# Patient Record
(demographics unavailable — no encounter records)

---

## 2024-10-10 NOTE — ASSESSMENT
[FreeTextEntry1] : #Acute on chronic lower back pain w/ left-sided sciatica  In the setting of significant prior weight gain that she's now steadily losing while on Zepbound Also has been lifting her kids who are nearly ~40 lbs heavy  Prenuvo screening whole body MRI from Jul 2024 showed B/L L5 pars defects without spondylolisthesis with mild degenerative changes of cervical and lumbar spine  - Pain control as ordered - PT referral - [facility]  - Advised to preemptively know which closest UC/ED she will have in [place] in case she has a flare that does not resolve with this management

## 2024-10-10 NOTE — ASSESSMENT
[FreeTextEntry1] : #Acute on chronic lower back pain w/ left-sided sciatica  In the setting of significant prior weight gain that she's now steadily losing while on Zepbound Also has been lifting her kids who are nearly ~40 lbs heavy  Prenuvo screening whole body MRI from Jul 2024 showed B/L L5 pars defects without spondylolisthesis with mild degenerative changes of cervical and lumbar spine  - Pain control as ordered - PT referral - [facility]  - Advised to preemptively know which closest UC/ED she will have in [place] in case she has a flare that does not resolve with this management

## 2024-10-10 NOTE — PHYSICAL EXAM
[No Acute Distress] : no acute distress [Well Nourished] : well nourished [Well Developed] : well developed [Well-Appearing] : well-appearing [Normal Voice/Communication] : normal voice/communication [No Respiratory Distress] : no respiratory distress  [Normal Rate] : normal rate  [No Joint Swelling] : no joint swelling [Grossly Normal Strength/Tone] : grossly normal strength/tone [No Rash] : no rash [Coordination Grossly Intact] : coordination grossly intact [Normal Gait] : normal gait [Normal] : affect was normal and insight and judgment were intact [de-identified] : No midline lumbar tenderness. No paraspinal tenderness. Mild soreness to palpation of left lateral hip. SLR negative B/L LE.

## 2024-10-10 NOTE — HISTORY OF PRESENT ILLNESS
[FreeTextEntry1] : Acute on chronic lower back pain w/ left-sided sciatica  [de-identified] : Ave has a history of lower back pain that developed during her last 2 pregnancies and has been intermittent She has a history of significant weight gain in the setting of 3 pregnancies under ~4 years and is on Zepbound  She carries her kids at home and says her kids are heavy ~42 lbs 2 months ago she had a flare when she had to pause walking    She had a flare again today with shooting pain in her left leg; no numbness/tingling, no urinary/stool incontinence. She is here to get examined as she's planning to travel to [place] for a few days  At the time of my encounter, she was in very mild pain left lateral hip with no occurrence of shooting pain, was able to walk w/o support Has a Prenuvo screening whole body MRI from Jul 2024 that showed B/L L5 pars defects without spondylolisthesis with mild degenerative changes of cervical and lumbar spine

## 2024-10-10 NOTE — HISTORY OF PRESENT ILLNESS
[FreeTextEntry1] : Acute on chronic lower back pain w/ left-sided sciatica  [de-identified] : Ave has a history of lower back pain that developed during her last 2 pregnancies and has been intermittent She has a history of significant weight gain in the setting of 3 pregnancies under ~4 years and is on Zepbound  She carries her kids at home and says her kids are heavy ~42 lbs 2 months ago she had a flare when she had to pause walking    She had a flare again today with shooting pain in her left leg; no numbness/tingling, no urinary/stool incontinence. She is here to get examined as she's planning to travel to [place] for a few days  At the time of my encounter, she was in very mild pain left lateral hip with no occurrence of shooting pain, was able to walk w/o support Has a Prenuvo screening whole body MRI from Jul 2024 that showed B/L L5 pars defects without spondylolisthesis with mild degenerative changes of cervical and lumbar spine

## 2024-10-10 NOTE — PHYSICAL EXAM
[No Acute Distress] : no acute distress [Well Nourished] : well nourished [Well Developed] : well developed [Well-Appearing] : well-appearing [Normal Voice/Communication] : normal voice/communication [No Respiratory Distress] : no respiratory distress  [Normal Rate] : normal rate  [No Joint Swelling] : no joint swelling [Grossly Normal Strength/Tone] : grossly normal strength/tone [No Rash] : no rash [Coordination Grossly Intact] : coordination grossly intact [Normal Gait] : normal gait [Normal] : affect was normal and insight and judgment were intact [de-identified] : No midline lumbar tenderness. No paraspinal tenderness. Mild soreness to palpation of left lateral hip. SLR negative B/L LE.

## 2025-01-15 NOTE — HISTORY OF PRESENT ILLNESS
[Other Location: e.g. School (Enter Location, City,State)___] : at [unfilled], at the time of the visit. [Medical Office: ([facility])___] : at the medical office located in  [Verbal consent obtained from patient] : the patient, [unfilled] [de-identified] : #History of obesity Had developed insulin resistance as per her Was started on Zepbound 5mg by Noom ~3 months ago Now down to 2.5mg a month ago  185 lbs > 150 lbs > 135 lbs  Exercise: Walking, 6 days a week of strength training mixing yoga/stretching  Diet: Ordering from meal delivery services / Breakfast is eggs or overnight oats / Lunch is salad w/ chicken or soup/sandwich / Dinner with protein + veg

## 2025-06-12 NOTE — HISTORY OF PRESENT ILLNESS
[Home] : at home, [unfilled] , at the time of the visit. [Medical Office: ([facility])___] : at the medical office located in  [Telehealth (audio & video)] : This visit was provided via telehealth using real-time 2-way audio visual technology. [Verbal consent obtained from patient] : the patient, [unfilled] [FreeTextEntry1] : History of obesity, on Zepbound prescribed by an online service Weight loss management switched to our clinic [de-identified] : Starting Jul 1st, insurance won't be covering Zepbound  Requesting letter of advocacy to be sent requesting continuation   History of obesity Had developed insulin resistance as per her Was started on Zepbound 5mg by Magdalena around May 2024 Now down to 2.5mg a month ago 185 lbs > 150 lbs > 135 lbs, stable Exercise: Walking, 6 days a week of strength training mixed with yoga/stretching Diet: Optimizes for protein

## 2025-06-12 NOTE — HISTORY OF PRESENT ILLNESS
[Home] : at home, [unfilled] , at the time of the visit. [Medical Office: ([facility])___] : at the medical office located in  [Telehealth (audio & video)] : This visit was provided via telehealth using real-time 2-way audio visual technology. [Verbal consent obtained from patient] : the patient, [unfilled] [FreeTextEntry1] : History of obesity, on Zepbound prescribed by an online service Weight loss management switched to our clinic [de-identified] : Starting Jul 1st, insurance won't be covering Zepbound  Requesting letter of advocacy to be sent requesting continuation   History of obesity Had developed insulin resistance as per her Was started on Zepbound 5mg by Magdalena around May 2024 Now down to 2.5mg a month ago 185 lbs > 150 lbs > 135 lbs, stable Exercise: Walking, 6 days a week of strength training mixed with yoga/stretching Diet: Optimizes for protein

## 2025-06-12 NOTE — PHYSICAL EXAM
[No Acute Distress] : no acute distress [Alert and Oriented x3] : oriented to person, place, and time [Normal Insight/Judgement] : insight and judgment were intact

## 2025-06-12 NOTE — ASSESSMENT
[FreeTextEntry1] : History of obesity c/b insulin resistance and HLD Now on maintenance Zepbound 2.5mg weekly and doing well  Continue Zepbound 2.5mg, refill for 12 weeks Will write letter of advocacy  Can obtain f/u labs at next annual in Aug